# Patient Record
Sex: MALE | Race: WHITE | NOT HISPANIC OR LATINO | Employment: OTHER | URBAN - METROPOLITAN AREA
[De-identification: names, ages, dates, MRNs, and addresses within clinical notes are randomized per-mention and may not be internally consistent; named-entity substitution may affect disease eponyms.]

---

## 2023-11-01 ENCOUNTER — OFFICE VISIT (OUTPATIENT)
Dept: ORTHOPEDIC SURGERY | Facility: CLINIC | Age: 73
End: 2023-11-01
Payer: MEDICARE

## 2023-11-01 VITALS
DIASTOLIC BLOOD PRESSURE: 70 MMHG | HEIGHT: 72 IN | SYSTOLIC BLOOD PRESSURE: 120 MMHG | BODY MASS INDEX: 34.81 KG/M2 | WEIGHT: 257 LBS

## 2023-11-01 DIAGNOSIS — G56.03 CARPAL TUNNEL SYNDROME, BILATERAL: Primary | ICD-10-CM

## 2023-11-01 RX ORDER — DICLOFENAC SODIUM 75 MG/1
TABLET, DELAYED RELEASE ORAL
COMMUNITY

## 2023-11-01 RX ORDER — LISINOPRIL AND HYDROCHLOROTHIAZIDE 12.5; 1 MG/1; MG/1
TABLET ORAL
COMMUNITY

## 2023-11-01 RX ORDER — LATANOPROST 50 UG/ML
SOLUTION/ DROPS OPHTHALMIC
COMMUNITY
Start: 2023-09-21

## 2023-11-01 RX ORDER — PANTOPRAZOLE SODIUM 20 MG/1
TABLET, DELAYED RELEASE ORAL
COMMUNITY

## 2023-11-01 RX ORDER — GABAPENTIN 300 MG/1
1 CAPSULE ORAL
COMMUNITY

## 2023-11-01 RX ORDER — ASPIRIN 81 MG
TABLET, DELAYED RELEASE (ENTERIC COATED) ORAL
COMMUNITY

## 2023-11-01 RX ORDER — TRAMADOL HYDROCHLORIDE 50 MG/1
TABLET ORAL
COMMUNITY
Start: 2023-09-08

## 2023-11-01 RX ORDER — METOPROLOL SUCCINATE 50 MG/1
TABLET, EXTENDED RELEASE ORAL
COMMUNITY

## 2023-11-01 RX ORDER — ATORVASTATIN CALCIUM 20 MG/1
TABLET, FILM COATED ORAL
COMMUNITY

## 2023-11-01 NOTE — PROGRESS NOTES
[facility]     Office Visit       Date: 11/01/2023   Patient Name: Kyle Mckeon  MRN: [id number]  YOB: 1950    Referring Physician: Jerry Anderson MD     Chief Complaint:   Chief Complaint   Patient presents with    Right Hand - Pain    Left Hand - Pain       History of Present Illness:   Kyle Mckeon is a 73 y.o. male RHD who presents with 5 year history of bilateral R>L hand numbness and tingling.  Reports numbness and tingling in the thumb, index and middle finger that is intermittent and worse at night and wakes them from sleep.  Has tried conservative measures, including corticosteroid injection by Dr. Anderson last week with very temporary improvement in symptoms.  Denies weakness or clumsiness of the affected hand.    No other relevant medical history. Patient works as retired. Denies smoking.         Subjective   Review of Systems:   Review of Systems   Constitutional:  Negative for chills, fever, unexpected weight gain and unexpected weight loss.   HENT:  Negative for congestion, postnasal drip and rhinorrhea.    Eyes:  Negative for blurred vision.   Respiratory:  Negative for shortness of breath.    Cardiovascular:  Negative for leg swelling.   Gastrointestinal:  Negative for abdominal pain, nausea and vomiting.   Genitourinary:  Negative for difficulty urinating.   Musculoskeletal:  Positive for arthralgias. Negative for gait problem, joint swelling and myalgias.   Skin:  Negative for skin lesions and wound.   Neurological:  Negative for dizziness, weakness, light-headedness and numbness.   Hematological:  Does not bruise/bleed easily.   Psychiatric/Behavioral:  Negative for depressed mood.         Pertinent review of systems per HPI.     I reviewed the patient's chief complaint, history of present illness, review of systems, past medical history, surgical history, family history, social history,  "medications and allergy list in the EMR on 11/01/2023 and agree with the findings above.    Objective    Vital Signs:   Vitals:    11/01/23 1122   BP: 120/70   Weight: 117 kg (257 lb)   Height: 182.9 cm (72\")     BMI: BMI is >= 30 and <35. (Class 1 Obesity). The following options were offered after discussion;: weight loss educational material (shared in after visit summary)       General Appearance: No acute distress. Alert and oriented.     Chest:  Non-labored breathing on room air. Regular rate and rhythm.    Right Upper Extremity Exam:    No palpable masses or visible lesions  Fingers are warm, well-perfused with appropriate capillary refill.  Palpable radial pulse.    Sensation intact to light touch in median, radial and ulnar nerve distributions.    Motor- Fires FPL, ulnar intrinsics, EPL/EDC w/ full active and passive range of motion. Strength intact.    Non-tender except for in the areas highlighted below    Sensation Diminished to light touch in median nerve distribution  Tinel's Sign-- positive  Phalen's Test--positive   Carpal Compression Test--positive  Thenar wasting--negative  Elbow Flexion test--negative  Cubital tunnel signs--negative    Left Upper Extremity Exam:    No palpable masses or visible lesions  Fingers are warm, well-perfused with appropriate capillary refill.  Palpable radial pulse.    Sensation intact to light touch in median, radial and ulnar nerve distributions.    Motor- Fires FPL, ulnar intrinsics, EPL/EDC w/ full active and passive range of motion. Strength intact.    Non-tender except for in the areas highlighted below    Sensation Diminished to light touch in median nerve distribution  Tinel's Sign-- positive  Phalen's Test--positive   Carpal Compression Test--positive  Thenar wasting--negative  Elbow Flexion test--negative  Cubital tunnel signs--negative    CTS-6 Questionnaire        R/L  Symptoms and History  Numbness in the median nerve territory  Y/y (3.5)   Nocturnal " numbness    Y/y (4)   Physical Examination  Thenar atrophy and/or weakness   N/N (5)    Positive Phalen's test    Y/Y (5)   Loss of 2-point Discrimination >5 mm  N/N (4.5)  Positive Tinel sign     Y/Y (4)                Total    16.5/16.5 (26)          Imaging/Studies:   Imaging Results (Last 24 Hours)       ** No results found for the last 24 hours. **          EMG and nerve conduction studies from 9/14/2023 were independently reviewed by myself and demonstrate evidence of a bilateral severe carpal tunnel syndrome as well as mild bilateral cubital tunnel syndrome.    Procedures:  Procedures    Quality Measures:   ACP:   ACP discussion was declined by the patient. Patient does not have an advance directive, declines further assistance.    Tobacco:   Kyle Mckeon  reports that he has been smoking pipe. He has never used smokeless tobacco..       Assessment / Plan    Assessment/Plan:     There are no diagnoses linked to this encounter.     Kyle Mckeonis a 73 y.o. male who presents with:      ICD-10-CM ICD-9-CM   1. Carpal tunnel syndrome, bilateral  G56.03 354.0       Regarding the carpal tunnel syndrome, the patient has been informed on the pathophysiology of their diagnosis and various treatment options including surgical release, or conservative management with night splinting and corticosteroid injection.  He has failed conservative management including night splinting and corticosteroid injection and would like to pursue surgery at this time.  We will proceed with right open carpal tunnel release followed by left open carpal tunnel release at a later point.    Consent-  Right open carpal tunnel release    The risks and benefits of the procedure were discussed with the patient and or appropriate guardian, which include but are not limited to the risk of bleeding, infection, neurovascular damage, post-operative stiffness, recurrence, tendon and/or ligament retears, recurrent instability, continued pain,  arthritic pain, need for further revision surgeries in the future, deep venous thrombosis, and general risks from anesthesia. We also discussed the post-operative rehabilitation, the need for physical therapy, and the overall expected outcomes from the procedure. We also discussed the possible use of biologics including allograft. We allowed proper time and answered the patient's questions regarding the procedure. The patient expressed understanding. Knowing what the risks are and what the conservative treatment is, the patient elected to forgo any further conservative treatment options and proceed with the surgical intervention.  Patient signed a surgical consent today.      Follow Up:   Return for Follow Up after Post Op.        Roberto Rucker MD  [facility] Surgeon

## 2023-12-05 ENCOUNTER — DOCUMENTATION (OUTPATIENT)
Age: 73
End: 2023-12-05
Payer: MEDICARE

## 2023-12-05 RX ORDER — CEPHALEXIN 500 MG/1
500 CAPSULE ORAL EVERY 6 HOURS
Qty: 20 CAPSULE | Refills: 0 | Status: SHIPPED | OUTPATIENT
Start: 2023-12-05 | End: 2023-12-10

## 2023-12-05 NOTE — PROGRESS NOTES
DATE OF PROCEDURE: 12/05/2023    LOCATION: [facility] [place]     PROCEDURES PERFORMED:    1. Right open carpal tunnel release CPT 79699    SURGEON: Roberto Rucker MD      ASSISTANTS:    1. None    * Surgery not found *      ANESTHESIA: Local    PREOPERATIVE DIAGNOSES:  1. Right carpal tunnel syndrome     POSTOPERATIVE DIAGNOSES:    Same     ESTIMATED BLOOD LOSS: 5 mL.    SPECIMENS: None    IMPLANTS: None    COMPLICATIONS: None     INDICATIONS:  Kyle Mckeon is a 73 y.o. male who initially presented with right carpal tunnel syndrome that has failed conservative therapy.  The risks, benefits, alternatives and potential complications of surgery were discussed with the patient including but not limited to bleeding scarring, infection, recurrence, stiffness, damage to surrounding structures and postoperative pain.  The patient understands the risks and agreed to proceed with surgery.  A surgical informed consent was signed prior to the procedure.      DESCRIPTION OF PROCEDURE:  The patient was greeted in the pre-operative holding area and the surgical site was marked and consent confirmed prior to bringing the patient to the operating room.  The patient was then taken to the operating room and a timeout was performed including the patient's name, procedure and antibiotic administration prior to the patient receiving local anesthesia anesthesia.  The patient was positioned supine on the operative room table and a non-sterile tourniquet was applied to the right upper extremity and it was then prepped and draped in the usual sterile fashion.  A combination of 1% lidocaine with epinephrine and 0.5% Marcaine was injected at the site of the incision for local anesthesia.  No antibiotics were given.     A 2.5 cm incision was made over the palm on the ulnar border the anticipated location of the transverse carpal ligament.  Superficial palmar fascia was then incised sharply.  Retractors were placed to  allow visualization of the transverse fibers of the transverse carpal ligament.  This was then carefully incised using a 15 blade.  Proximally the transverse carpal ligament and antebrachial fascia were incised under direct visualization using careful retraction and a tenotomy scissor.  Wound was then irrigated with copious amounts normal saline solution.  Incision was closed with 4-0 nylon in horizontal mattress fashion.  Soft dressing was applied.     At the end of the procedure the patient was awoken from anesthesia and transferred to the PACU in stable condition.  I participated in all parts of the case.

## 2023-12-20 ENCOUNTER — OFFICE VISIT (OUTPATIENT)
Dept: ORTHOPEDIC SURGERY | Facility: CLINIC | Age: 73
End: 2023-12-20
Payer: MEDICARE

## 2023-12-20 VITALS — TEMPERATURE: 97.8 F

## 2023-12-20 DIAGNOSIS — G56.03 CARPAL TUNNEL SYNDROME, BILATERAL: Primary | ICD-10-CM

## 2023-12-20 PROCEDURE — 99024 POSTOP FOLLOW-UP VISIT: CPT | Performed by: PLASTIC SURGERY

## 2023-12-20 RX ORDER — CALCIUM POLYCARBOPHIL 625 MG
TABLET ORAL
COMMUNITY

## 2023-12-20 NOTE — PROGRESS NOTES
[facility]     Follow-up Office Visit       Date: 12/20/2023   Patient Name: Kyle Mckeon  MRN: [id number]  YOB: 1950    Chief Complaint:   Chief Complaint   Patient presents with    Post-op Follow-up     2 weeks S/P Right carpal tunnel syndrome (DOS: 12/5/23)        History of Present Illness:   Kyle Mckeon is a 73 y.o. male presents for follow-up status post right carpal tunnel syndrome on 12/5/2023.  He reports he has been doing well since surgery.  He reports he is no longer having numbness and tingling in his right hand.  He has minimal pain at the incision.  He continues to have some left hand numbness and tingling but he is concerned because [facility] will be out of network starting January 1.      Subjective   Review of Systems:   Review of Systems   Constitutional: Negative.  Negative for chills, fatigue and fever.   HENT: Negative.  Negative for congestion and dental problem.    Eyes: Negative.  Negative for blurred vision.   Respiratory: Negative.  Negative for shortness of breath.    Cardiovascular: Negative.  Negative for leg swelling.   Gastrointestinal: Negative.  Negative for abdominal pain.   Endocrine: Negative.  Negative for polyuria.   Genitourinary: Negative.  Negative for difficulty urinating.   Musculoskeletal:  Positive for arthralgias.   Skin: Negative.    Allergic/Immunologic: Negative.    Neurological: Negative.    Hematological: Negative.  Negative for adenopathy.   Psychiatric/Behavioral: Negative.  Negative for behavioral problems.         Pertinent review of systems per HPI    I reviewed the patient's chief complaint, history of present illness, review of systems, past medical history, surgical history, family history, social history, medications and allergy list in the EMR on 12/20/2023 and agree with the findings above.    Objective    Vital Signs:   Vitals:    12/20/23 1005   Temp:  97.8 °F (36.6 °C)     BMI: BMI is >= 30 and <35. (Class 1 Obesity). The following options were offered after discussion;: weight loss educational material (shared in after visit summary)       General Appearance: No acute distress. Alert and oriented.     Chest:  Non-labored breathing on room air      Right upper Extremity:  Right palmar incision clean dry intact and healing appropriately  Fingers warm and well-perfused distally  Sensation intact to light touch in the median, radian and ulnar nerve distributions    Imaging/Studies:   Imaging Results (Last 24 Hours)       ** No results found for the last 24 hours. **            No new imaging    Procedures:  Procedures    Quality Measures:   ACP:   ACP discussion was declined by the patient. Patient does not have an advance directive, declines further assistance.    Tobacco:   Kyle Mckeon  reports that he has been smoking pipe. He has never used smokeless tobacco..          Assessment / Plan    Assessment/Plan:      Diagnosis Plan   1. Carpal tunnel syndrome, bilateral            Patient presents 2-week status post right open carpal tunnel release.  He is doing well and his incision is healing appropriately.  His sutures were removed today.  He is no longer having right carpal tunnel symptoms.  He continues to have left carpal tunnel symptoms however he is going to be out of network starting January 1.  He would like to wait to see if his insurance negotiates with [facility], otherwise I will refer him to another hand surgeon for left carpal tunnel release.  Recommend follow-up in 3 months.    Follow Up:   Return in about 3 months (around 3/20/2024).        Roberto Rucker MD  [facility] [place] [facility] Surgeon